# Patient Record
(demographics unavailable — no encounter records)

---

## 2024-11-05 NOTE — DISCUSSION/SUMMARY
[de-identified] : CC: right arm pain and neck pain  HPI  46 F who I saw in 2021 for LBP and L5-S1 HNP who is now here for cervical pain. this has been acute on chronic. the last 2 weeks have been progressively worsened. the patient reports numbness and pain in the right arm. the patient is RHD. pt reports numbness in the right hand. there are minimal issues in the left side. the patient reports difficulty with opening jars or grading tests due to her right hand numbness. shes taking ibuprofen, flexeril and doing nerve glides.    Examination  pt is NAD, AAOX3 skin is intact no palpable stepoff 5/5 motor strength BUE except right tricep 3/5 SILT C5-T1 BUE POSITIVE right SPURLING Negative ceron normal rapid  test equal reflexes bicep/BR/tricep hand wwp bcr  imaging  XR C spine 4 view which I personally reviewed demonstrated C4-7 DDD. moderate disc space collapse at C5-6 and C6-7   Treatment note i discussed at length with the patient the nature of the condition. the patient has acute right arm weakness in the tricep distribution. this is an acute on chronic exacerbation. she never had weakness. we will order a cervical MRI. we will prescribe prednisone and gabapentin. will f/u after MRI. if pt has progressive weakness with a concomitant lesion in the cervical spine MRI, we will recommend surgery all the patients questions were sought and answered

## 2024-11-11 NOTE — DISCUSSION/SUMMARY
[de-identified] : Chief complaint  Follow-up  HPI  The patient is here today for follow-up of her right arm symptoms.  She continues to have weakness in her right arm.  She comes of the MRI.  She has persistent numbness into her hand and her right tricep feels weak.  She has no issues in the left side.  No fevers or chills.  No issues with fine motor activity.  No issues with gait or balance or bowel bladder incontinence. This is affecting her quality life  Examination Pt is NAD, AAOX3 skin is intact no palpable stepoff 5/5 motor strength BUE except the right tricep is 3 out of 5 SILT C5-T1 BUE POSITIVE SPURLING Negative ceron normal rapid  test equal reflexes bicep/BR/tricep hand wwp bcr  Imaging MRI of the cervical spine which I personally reviewed demonstrates large disc herniation at C6-C7 that is causing severe right-sided nerve root compression  Treatment note I discussed at length with the patient nature of the condition.  The patient has a large disc herniation at C6-C7 causing her right tricep weakness and arm symptoms.  Will have her see Dr. Meek for consideration of injections.  I will see her back in a month.  If the weakness still persist, we will recommend a disc replacement.  All the patient's questions were sought answered.

## 2024-11-18 NOTE — HISTORY OF PRESENT ILLNESS
[Neck Pain] : neck pain [___ wks] : [unfilled] week(s) ago [Constant] : constant [4] : an average pain level of 4/10 [3] : a minimum pain level of 3/10 [6] : a maximum pain level of 6/10 [Aching] : aching [Shooting] : shooting [Looking Up] : looking up [Looking Down] : looking down [Laying] : laying [Medications] : medications [Heat] : heat [Other: ___] : [unfilled] [FreeTextEntry1] : HPI: Ms. GERMÁN ESQUIVEL is a 46 year F with pmhx of thrombocytosis. Right sided cervical radiculopathy x 3 weeks. Pain to neck, right scapula, tricep, and forearm. Numbness to second two digits. Physical therapy, gabapentin, and oral steroids with no improvement. Pain is severe and impacting her quality of life. Denies any additional weakness, numbness, bowel/bladder dysfunction. Quality of life is impaired. There has been a severe exacerbation of the patient's chronic pain.   [FreeTextEntry7] : Radiaiting down right arm  [de-identified] : priya

## 2024-11-18 NOTE — CONSULT LETTER
[Dear  ___] : Dear  [unfilled], [Consult Letter:] : I had the pleasure of evaluating your patient, [unfilled]. [Please see my note below.] : Please see my note below. [Consult Closing:] : Thank you very much for allowing me to participate in the care of this patient.  If you have any questions, please do not hesitate to contact me. [Sincerely,] : Sincerely, [FreeTextEntry3] : Dawson Cunningham MD

## 2024-11-18 NOTE — PHYSICAL EXAM
[de-identified] : Constitutional: Well-developed, in no acute distress  Musculoskeletal: Cervical Spine:    Gait: Antalgic Inspection: Normal curvature, no abnormal kyphosis or scoliosis  Facet loading: pain bilaterally  Palpation: Cervical paraspinal muscles: pain bilaterally 		 Muscle Strength: Deltoid: 5/5 bilaterally Biceps: 5/5 bilaterally Triceps: 5/5 bilaterally Adductor pollicis: 5/5 bilaterally  Sensation: normal and equal in bilateral upper extremities  Extremity: no edema noted Neurological: Memory normal, AAO x 3, Cranial nerves II - XII grossly normal Psychiatric: Appropriate mood and affect, oriented to time, place, person, and situation

## 2024-11-18 NOTE — DATA REVIEWED
[FreeTextEntry1] : Exam Date:      11/07/24 Exam:         MRI CERVICAL SPINE   Findings: There is trace anterolisthesis of C5 on C6 and C7 on T1. There is mild straightening of the cervical spine, likely positional. The vertebral bodies are of normal height. There is mild to moderate loss of disc height and T2 signal at the C4/C5, C5/C6, C6/C7 with anterior minimal posterior osteophyte formation consistent with desiccation and degenerative changes. There are is mild loss of disc height and T2 signal within the C3/C4 and C7/T1 disc spaces and loss of T2 signal within the C2/C3 disc space consistent with desiccation. Evaluation of the spinal cord demonstrates no evidence of abnormal signal changes.  The marrow signal is within normal limits.  The cervicomedullary junction is normal.   Evaluation of the individual levels demonstrates at the C2/C3 level there is a tiny central disc protrusion contacting the ventral thecal sac. The bilateral neuroforamen are patent. There is no evidence of spinal cord compression.   At the C3/C4 no evidence of a focal disc herniation. There is right uncovertebral and mild facet hypertrophy. There is moderate to severe right foraminal narrowing. The left neuroforamen are patent. There is no evidence of spinal cord compression.   At C4/C5 level there is a minimal disc osteophyte complex contacting the ventral thecal sac. There is bilateral uncovertebral and facet hypertrophy. There is moderately severe bilateral foraminal narrowing. There is no evidence of spinal cord compression.   At the C5/C6 level there is a small central/left paracentral shallow disc protrusion flattening the left ventral thecal sac and mildly narrowing the left subarticular zone. There is mild uncovertebral and facet hypertrophy. There is moderate to severe left foraminal narrowing. The right neuroforamen is patent. There is no evidence of spinal cord compression.   At the C6/C7 level there is a minimal disc bulge with a superimposed right paracentral and subarticular shallow disc protrusion flattening the ventral thecal sac, right greater than left and contributing to moderately severe right foraminal narrowing. There is narrowing of the right subarticular zone. There is bilateral uncovertebral and facet hypertrophy. There is left moderate to severe foraminal narrowing. There is no evidence of spinal cord compression.   At the C7/T1 level there is a minimal disc bulge contacting the ventral thecal sac. There is moderately severe right foraminal narrowing. There is mild left foraminal narrowing. There is facet hypertrophy, right greater than left. There is no evidence of final cord compression.   Impression:  Trace anterolisthesis of C5 on C6 and C7 on T1.  Mild straightening of the cervical spine, likely positional.   C2/C3  tiny central disc protrusion. C2/C3 no evidence of spinal cord compression.   C3/C4 no evidence of a focal disc herniation. There is moderate to severe right foraminal narrowing. C3/C4 no evidence of spinal cord compression.   C4/C5  minimal disc osteophyte complex with moderately severe bilateral foraminal narrowing. C4/C5 no evidence of spinal cord compression.   C5/C6  small central/left paracentral shallow disc protrusion mildly narrowing the left subarticular zone and moderate to severe left foraminal narrowing. C5/C6 no evidence of spinal cord compression.   C6/C7  minimal disc bulge with a superimposed right paracentral and subarticular shallow disc protrusion  contributing to moderately severe right foraminal narrowing and narrowing of the right subarticular zone. There  There is left moderate to severe foraminal narrowing.   C7/T1  minimal disc bulge with moderately severe right foraminal narrowing. C7/T1 no evidence of final cord compression.

## 2024-11-18 NOTE — ASSESSMENT
[FreeTextEntry1] : 46 yof w/ severe neck and arm pain. Quality of life is impaired. There has been a severe exacerbation of the patient's chronic pain.  I have personally reviewed the patient's MRI in detail and discussed my personal interpretation of the study which is significant for moderate to severe right foraminal narrowing at C7-T1 with a disc bulge. There is no central stenosis.   The patient has failed to have relief with medication management and physical therapy. Given the patients failure to improve with all other conservative measures, recommend C7-T1 interlaminar epidural steroid injection under fluoroscopic guidance. The patient will follow-up with me in my office two weeks following intervention.  Due to weakness may be a surgical candidate if no relief w/ KRISTOFER.   I have discussed in detail with the patient that an interventional spine procedure is associated with potential risks. The procedure may include an injection of steroid and potentially other medications (local anesthetic and normal saline) into the epidural space or surrounding tissue of the spine. There are significant risks of this procedure which include and are not limited to infection, bleeding, worsening pain, dural puncture leading to post-dural puncture headache, nerve damage, spinal cord injury, paralysis, stroke, and death. There is a chance that the procedure does not improve their pain. There are risks associated with the steroid being absorbed into the body systemically. These include dysphoria, difficulty sleeping, mood swings, and personality changes. Pre-menopausal women may notice a regularity his in her menstrual cycle for 2-3 months following the injection. Steroids can specifically affect patients with hypertension, diabetes, and peptic ulcers. The procedure may cause a temporary increase in blood pressure and blood glucose, and may adversely affect a peptic ulcer. Other, more rare complications, including avascular necrosis of the joints, glaucoma, and osteoporosis. I have discussed the risks of the procedure at length with the patient, and the potential benefits of pain relief. I have offered alternatives to the procedure. All questions were answered. The patient expressed understanding and wishes to proceed with the procedure.  Physical therapy prescribed - goal will be to increase ROM, strengthening, postural training, other modalities ad sharron which may include massage and stim. Goals of therapy discussed with the patient in detail and will be discussed with physical therapist. Patient will follow-up following course of physical therapy to monitor progress and adjust therapy as needed.  Acetaminophen 1,000 mg q8h prn for moderate pain. Risks, benefits, and alternatives of acetaminophen discussed with patient.  Ibuprofen 600 mg q8h prn add when pain is not adequately controlled with acetaminophen. Risks, benefits, and alternatives of ibuprofen discussed with patient.  Diet and nutritional strategies discussed which may improve patients pain and will improve overall health.

## 2024-12-16 NOTE — ASSESSMENT
[FreeTextEntry1] : 46 yof w/ severe neck and arm pain which has improved after KRISTOFER.  I have personally reviewed the patient's MRI in detail and discussed my personal interpretation of the study which is significant for moderate to severe right foraminal narrowing at C7-T1 with a disc bulge. There is no central stenosis.   RTC Dr. Keys to discuss next steps in plan of care.  Physical therapy prescribed - goal will be to increase ROM, strengthening, postural training, other modalities ad sharron which may include massage and stim. Goals of therapy discussed with the patient in detail and will be discussed with physical therapist. Patient will follow-up following course of physical therapy to monitor progress and adjust therapy as needed.  Acetaminophen 1,000 mg q8h prn for moderate pain. Risks, benefits, and alternatives of acetaminophen discussed with patient.  Ibuprofen 600 mg q8h prn add when pain is not adequately controlled with acetaminophen. Risks, benefits, and alternatives of ibuprofen discussed with patient.  Diet and nutritional strategies discussed which may improve patients pain and will improve overall health.

## 2024-12-16 NOTE — PHYSICAL EXAM
[de-identified] : Constitutional: Well-developed, in no acute distress  Musculoskeletal: Cervical Spine:    Gait: Antalgic Inspection: Normal curvature, no abnormal kyphosis or scoliosis  Facet loading: pain bilaterally  Palpation: Cervical paraspinal muscles: pain bilaterally 		 Muscle Strength: Deltoid: 5/5 bilaterally Biceps: 5/5 bilaterally Triceps: 5/5 bilaterally Adductor pollicis: 5/5 bilaterally  Sensation: normal and equal in bilateral upper extremities  Extremity: no edema noted Neurological: Memory normal, AAO x 3, Cranial nerves II - XII grossly normal Psychiatric: Appropriate mood and affect, oriented to time, place, person, and situation

## 2024-12-16 NOTE — HISTORY OF PRESENT ILLNESS
[Neck Pain] : neck pain [___ wks] : [unfilled] week(s) ago [Constant] : constant [4] : an average pain level of 4/10 [3] : a minimum pain level of 3/10 [6] : a maximum pain level of 6/10 [Aching] : aching [Shooting] : shooting [Looking Up] : looking up [Looking Down] : looking down [Laying] : laying [Medications] : medications [Heat] : heat [Other: ___] : [unfilled] [FreeTextEntry1] : Interval history: Pt returns s/p KRISTOFER. Reports significant relief. Weakness in triceps is improving. Tingling in hand diminishing. Still has pain. Quality of life is impaired.  HPI: Ms. GERMÁN ESQUIVEL is a 46 year F with pmhx of thrombocytosis. Right sided cervical radiculopathy x 3 weeks. Pain to neck, right scapula, tricep, and forearm. Numbness to second two digits. Physical therapy, gabapentin, and oral steroids with no improvement. Pain is severe and impacting her quality of life. Denies any additional weakness, numbness, bowel/bladder dysfunction. Quality of life is impaired. There has been a severe exacerbation of the patient's chronic pain.   Interventions: C7-T1 interlaminar KRISTOFER  [FreeTextEntry7] : Radiaiting down right arm  [de-identified] : priya

## 2024-12-23 NOTE — DISCUSSION/SUMMARY
[de-identified] : Chief complaint Follow-up  HPI Patient is a very pleasant 46 years open following for C6-C7 right-sided disc herniation with right tricep weakness.  Patient had an injection and felt a lot better.  Numbness and tingling is better.  She feels like her strength is slightly better.  She still is unable to do all activities with her right arm.  No fevers or chills.  No issues with gait or balance bowel bladder incontinence.  Pt is NAD, AAOX3 skin is intact no palpable stepoff 5/5 motor strength BUE, right tricep is 3 out of 5 SILT C5-T1 BUE POSITIVE SPURLING Negative ceron normal rapid  test equal reflexes bicep/BR/tricep hand wwp bcr  Imaging MRI of the cervical spine demonstrates right C6-C7 disc herniation with significant right-sided stenosis  Treatment note I discussed at length with the patient nature of the condition.  The patient presents with neck and right arm issues with tricep weakness.  Her strength has not improved dramatically although her pain has improved.  Will send her for EMG.  Will see her back in a month.  If her symptoms have not improved dramatically, we will recommend a C6-C7 disc replacement.  Her weakness has been ongoing for about 6 weeks.  We discussed unpredictable nature of motor recovery even with surgery at this time.  All the patient's questions were sought and answered.

## 2025-01-15 NOTE — DISCUSSION/SUMMARY
[de-identified] : Chief complaint Follow-up  HPI Patient a very pleasant 46-year here for follow-up of her right tricep weakness.  This balance since October.  She had a EMG done demonstrating a right C7 radiculopathy.  She has had worsening dysfunction of her right tricep.  No fevers or chills.  She is exhausted conservative treatment.  Pt is NAD, AAOX3 skin is intact no palpable stepoff 5/5 motor strength BUE except the right tricep which is 3+ out of 5 SILT C5-T1 BUE POSITIVE SPURLING Negative ceron normal rapid  test equal reflexes bicep/BR/tricep hand wwp bcr  Imaging MRI and x-ray cervical spine which I personally reviewed demonstrate C4-C7 DDD C6-C7 large right-sided disc herniation with severe right-sided foraminal stenosis  Treatment note I discussed at length with the patient into the condition.  The patient presents with 3-month duration of neck and right arm issues with tricep weakness.  The MRI is consistent with this.  Unfortunate her weakness has not improved.  EMG demonstrates severe right-sided radiculopathy.  The patient is a candidate for C6-C7 disc replacement.  Risk of the procedure were discussed which include balance infection bleeding nerve injury injury to dural sac possible implant migration possible adjacent segment disease, risk of anesthesia including death and paralysis.  We discussed variable motor return.  After discussion of risk and benefits the patient like to proceed.  This performed at [facility] in outpatient manner.  She will need full medical clearance.  All the patient's questions were sought and answered.

## 2025-02-12 NOTE — DISCUSSION/SUMMARY
[de-identified] : Chief complaint  follow-up  HPI Patient is a very pleasant 46-year-old here for 2-week postop check from a C6-C7 disc replacement.  The patient is doing well.  She has minimal pain.  Her tricep feels better.  No issues with gait or balance bowel bladder incontinence.  She is taking no narcotics.  No fevers or chills.  Has been compliant with the restrictions  Pt is NAD, AAOX3 skin is intact no palpable stepoff 5/5 motor strength BUE SILT C5-T1 BUE NEGATIVE SPURLING Negative ceron normal rapid  test equal reflexes bicep/BR/tricep hand wwp bcr  Imaging   x-rays cervical spine demonstrate intact instrumentation at C6-C7   Treatment note I discussed at length with the patient nature the condition.  The patient presents with 2-week status post a C6-C7 disc replacement for large right-sided disc herniation and right-sided tricep weakness.  Her strength is significantly better.  Will continue with spine precautions.  I will see her back in a month.  All the patient's questions were sought answered.

## 2025-02-26 NOTE — HISTORY OF PRESENT ILLNESS
[FreeTextEntry1] : annual [de-identified] : Pt has seen Dr Dorado, had BM bx done- has ET- not on asprin- observation for now.  She has a certain mutation and is checked now every 6 months at hematology. Pt has seen Dr Keys and had a C5 and C6 disc replacement due to a herniated disc that was causing weakness in her right arm. She tried epidurals and injections but they did not help.  She feels her strength is coming back. She has been trying to consume a low cholesterol diet and had been exercising until her neck surgery.

## 2025-02-26 NOTE — HEALTH RISK ASSESSMENT
07/06/21        [facility] [id number]      Dear Bucky Luis records indicate that you have outstanding lab work and or testing that was ordered for you and has not yet been completed:  Orders Placed This Encounter [Very Good] : ~his/her~  mood as very good [No] : In the past 12 months have you used drugs other than those required for medical reasons? No [0] : 2) Feeling down, depressed, or hopeless: Not at all (0) [Never] : Never [NO] : No [Patient reported colonoscopy was normal] : Patient reported colonoscopy was normal [With Family] : lives with family [Employed] : employed [] :  [# Of Children ___] : has [unfilled] children [Sexually Active] : sexually active [Feels Safe at Home] : Feels safe at home [Patient reported mammogram was normal] : Patient reported mammogram was normal [Patient reported PAP Smear was normal] : Patient reported PAP Smear was normal [HIV test declined] : HIV test declined [Hepatitis C test declined] : Hepatitis C test declined [Audit-CScore] : 0 [QVT2Divii] : 0 [Change in mental status noted] : No change in mental status noted [High Risk Behavior] : no high risk behavior [MammogramDate] : 08/23 [MammogramComments] : fibroadenom [PapSmearDate] : 02/23 [ColonoscopyDate] : 03/24 [FreeTextEntry2] :

## 2025-02-26 NOTE — PHYSICAL EXAM
[Normal Sclera/Conjunctiva] : normal sclera/conjunctiva [Normal Outer Ear/Nose] : the outer ears and nose were normal in appearance [Normal TMs] : both tympanic membranes were normal [No JVD] : no jugular venous distention [No Lymphadenopathy] : no lymphadenopathy [Supple] : supple [Pedal Pulses Present] : the pedal pulses are present [No Edema] : there was no peripheral edema [Normal Appearance] : normal in appearance [No Nipple Discharge] : no nipple discharge [No Axillary Lymphadenopathy] : no axillary lymphadenopathy [Normal Posterior Cervical Nodes] : no posterior cervical lymphadenopathy [Normal Anterior Cervical Nodes] : no anterior cervical lymphadenopathy [No CVA Tenderness] : no CVA  tenderness [No Spinal Tenderness] : no spinal tenderness [No Joint Swelling] : no joint swelling [Grossly Normal Strength/Tone] : grossly normal strength/tone [No Rash] : no rash [No Focal Deficits] : no focal deficits [Speech Grossly Normal] : speech grossly normal [Normal Affect] : the affect was normal [Alert and Oriented x3] : oriented to person, place, and time [Normal Mood] : the mood was normal [Normal Insight/Judgement] : insight and judgment were intact [Normal] : soft, non-tender, non-distended, no masses palpated, no HSM and normal bowel sounds [Coordination Grossly Intact] : coordination grossly intact [Normal Gait] : normal gait [de-identified] : scar anterior neck from recent surgery [de-identified] : right sided known fibroadenoma

## 2025-03-14 NOTE — CONSULT LETTER
[Dear  ___] : Dear  [unfilled], [Courtesy Letter:] : I had the pleasure of seeing your patient, [unfilled], in my office today. [Please see my note below.] : Please see my note below. [Consult Closing:] : Thank you very much for allowing me to participate in the care of this patient.  If you have any questions, please do not hesitate to contact me. [Sincerely,] : Sincerely, [FreeTextEntry3] : Murphy Dorado MD, MPH  of Medicine [facility] [employer] [facility] at [facility] Attending Physician  Hematology and Medical Oncology [facility]

## 2025-03-14 NOTE — ASSESSMENT
[FreeTextEntry1] : ## Essential thrombocytosis CALR exon 9 mutation Very low risk Asymptomatic No prior history of DVT, PE Bone marrow [4/11/2024]: Essential thrombocythemia.  CALR mutation positive.  Negative reticulin fibrosis Guidelines suggest either observation versus low dose aspirin. Given that she is otherwise healthy, does not have any prior history of VTE, CAD, CVA, and also does not have Asad mutation, she was advised her to consider observation only with Dr. Dorado   Patient is here for f/u s/p cervical disc surgery on 2/6/2025, still had mild and taking Advid prn. -Labs are drawn in the office, reviewed, analyzed, and discussed -PLTs remain the same around 700s Advised to continue with iron supplement  Signs and symptoms of VTEs re-iterated. continue to monitor for now.  Iron deficiency Has been on oral iron (SLow FE) x 12 months Has menorrhagia - Sees Dr Mendelowitz/team. No obvious reasons  s/p colonoscopy with Dr. Delgado in 4/2024 Ferritin 42 - continue with iron supplement.   Patient had multiple questions which were answered to satisfaction  d/w Dr. Dorado  She will do her labs with her PCP in 6 months. Follow-up in 6 months CBC, CMP, Ferritin, iron panel, LDH

## 2025-03-14 NOTE — HISTORY OF PRESENT ILLNESS
[de-identified] : Ms. Ynes Rajan is 45 years old female with thrombocytosis here for consultation, referred by Dr. Manuel Giles  Patient with hx of LUBNA (improved with Slow release daily in the last year) and menorrhagia (7 days with 3-4 days with clots). LMP - mid 2024  2/15/2024 WBC 7.69 H/H 14.7/45 MCV 93.8   iron sat 19%, Ferritin - 38 2022 , normal prior to   FHx: Sister with cervical cancer at age 33 -   ARMANDO Mobley with stomach cancer in his 70s M. Grandmother with skin cancer   SocialHx: Never smoked Denies any alcohol use Denies any illicit drugs Works as  - English in [place]  Patient used to have fatigue but better control since on iron supplement. been taking ASA since finding out with high platelets.  Schedule for colonoscopy 2023 with Dr. 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 Menses are heavy with 6 days and 3 days being heavy, been taking iron supplement daily and well tolerated.

## 2025-03-14 NOTE — RESULTS/DATA
[FreeTextEntry1] : Labs reviewed, analyzed and discussed 2/15/2024 WBC 7.69 H/H 14.7/45 MCV 93.8  iron sat 19%, Ferritin - 38 5/2022 , normal prior to 2020

## 2025-03-14 NOTE — CONSULT LETTER
[Dear  ___] : Dear  [unfilled], [Courtesy Letter:] : I had the pleasure of seeing your patient, [unfilled], in my office today. [Please see my note below.] : Please see my note below. [Consult Closing:] : Thank you very much for allowing me to participate in the care of this patient.  If you have any questions, please do not hesitate to contact me. [Sincerely,] : Sincerely, [FreeTextEntry3] : Murphy Dorado MD, MPH  of Medicine [facility] Attending Physician  Hematology and Medical Oncology [facility]

## 2025-03-14 NOTE — HISTORY OF PRESENT ILLNESS
[de-identified] : Ms. Ynes Rajan is 45 years old female with thrombocytosis here for consultation, referred by Dr. Manuel Giles  Patient with hx of LUBNA (improved with Slow release daily in the last year) and menorrhagia (7 days with 3-4 days with clots). LMP - mid 2024  2/15/2024 WBC 7.69 H/H 14.7/45 MCV 93.8   iron sat 19%, Ferritin - 38 2022 , normal prior to   FHx: Sister with cervical cancer at age 33 -   ARMANDO Mobley with stomach cancer in his 70s M. Grandmother with skin cancer   SocialHx: Never smoked Denies any alcohol use Denies any illicit drugs Works as  - English in [place]  Patient used to have fatigue but better control since on iron supplement. been taking ASA since finding out with high platelets.  Schedule for colonoscopy 2023 with Dr. 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 Menses are heavy with 6 days and 3 days being heavy, been taking iron supplement daily and well tolerated.

## 2025-03-19 NOTE — DISCUSSION/SUMMARY
[de-identified] : Chief complaint Postoperative check  HPI Patient is a very pleasant 46-year-old now 6-week status post a C6-C7 disc replacement.  The patient is doing well.  Has no major complaints.  Arm strength is improving.  She takes gabapentin at nighttime.  No issues with gait or balance bowel bladder incontinence.    Pt is NAD, AAOX3 skin is intact no palpable stepoff 5/5 motor strength BUE SILT C5-T1 BUE NEGATIVE SPURLING Negative ceron normal rapid  test equal reflexes bicep/BR/tricep hand wwp bcr Well-healing incision  Imaging Review of x-rays demonstrate intact instrumentation at C6-C7.  Treatment note I discussed at length with the patient into the condition.  The patient is doing well both clinically radiographically.  Her arm strength is improving.  She will continue with spine precautions we will see her back in 6 weeks.  All the patient's questions were sought answered.
